# Patient Record
Sex: MALE | Race: BLACK OR AFRICAN AMERICAN | NOT HISPANIC OR LATINO | Employment: UNEMPLOYED | URBAN - METROPOLITAN AREA
[De-identification: names, ages, dates, MRNs, and addresses within clinical notes are randomized per-mention and may not be internally consistent; named-entity substitution may affect disease eponyms.]

---

## 2017-03-01 ENCOUNTER — HISTORICAL (OUTPATIENT)
Dept: RADIOLOGY | Facility: HOSPITAL | Age: 57
End: 2017-03-01

## 2017-12-07 ENCOUNTER — HISTORICAL (OUTPATIENT)
Dept: RADIOLOGY | Facility: HOSPITAL | Age: 57
End: 2017-12-07

## 2018-10-01 ENCOUNTER — HISTORICAL (OUTPATIENT)
Dept: ENDOSCOPY | Facility: HOSPITAL | Age: 58
End: 2018-10-01

## 2018-10-10 ENCOUNTER — HISTORICAL (OUTPATIENT)
Dept: RADIOLOGY | Facility: HOSPITAL | Age: 58
End: 2018-10-10

## 2019-01-31 ENCOUNTER — HISTORICAL (OUTPATIENT)
Dept: LAB | Facility: HOSPITAL | Age: 59
End: 2019-01-31

## 2022-04-09 ENCOUNTER — HISTORICAL (OUTPATIENT)
Dept: ADMINISTRATIVE | Facility: HOSPITAL | Age: 62
End: 2022-04-09

## 2022-04-25 VITALS — OXYGEN SATURATION: 99 % | SYSTOLIC BLOOD PRESSURE: 111 MMHG | DIASTOLIC BLOOD PRESSURE: 68 MMHG

## 2022-04-30 NOTE — OP NOTE
Patient:   Jose Santana Jr            MRN: [id number]            FIN: [id number]               Age:   58 years     Sex:  Male     :  1960   Associated Diagnoses:   None   Author:   Talita Velasquez MD      Procedure     PROCEDURE:  Colonoscopy    INDICATION:   screening    CONSENT:   The benefits, risks, and alternatives to the procedure were discussed and informed consent was obtained from the patient.     PREPARATION:   EKG, pulse, pulse oximetry, and blood pressure were monitored throughout the procedure.   ASA class III  Mallampati II    MEDICATIONS:   Monitored anesthesia care per anesthesiology    RECTAL EXAM:   Normal rectal exam    PROCEDURE: The colonoscope was passed through the anus under direct visualization and was advanced with ease to the cecum with visualization of the cecal folds, appendiceal orifice, and IC valve.  The scope was withdrawn and the mucosa was carefully examined. Retroflexion was performed in the rectum.  The patient tolerated the procedure well.  The preparation was good.     FINDINGS:       One 2 mm sessile polyp in the transverse colon removed with cold forceps polypectomy.    Otherwise normal colonoscopy without any mass, diverticulosis, or colitis    UNPLANNED EVENTS:   There were no unplanned events.     EBL:  < 5 cc    RECOMMENDATIONS:   - Discharge home  - Follow-up pathology  - Repeat colonoscopy in 5 years for surveillance    PATHOLOGY:   Transverse colon polyp

## 2022-04-30 NOTE — OP NOTE
Patient:   Jose Santana Jr            MRN: [id number]            FIN: [id number]               Age:   58 years     Sex:  Male     :  1960   Associated Diagnoses:   None   Author:   Talita Velasquez MD      Procedure     PROCEDURE:  Esophagogastroduodenoscopy.    INDICATION:   nausea and vomiting    CONSENT:   The benefits, risks, and alternatives to the procedure were discussed and informed consent was obtained from the patient.     PREPARATION:   EKG, pulse, pulse oximetry, and blood pressure were monitored throughout the procedure.   ASA class II  Mallampati II    MEDICATIONS:   Monitored anesthesia care per anesthesiology    PROCEDURE: The gastroscope was passed through the mouth under direct visualization and was advanced with ease to the second portion of the duodenum.  The scope was withdrawn and the mucosa was carefully examined. Retroflexion was performed in the stomach.  Patient tolerated the procedure well.     FINDINGS:   ESOPHAGUS:   Normal esophagus.  No abnormalities seen.  GE junction was at 39 cm.    STOMACH:   Diffuse friable, erythematous and atrophic mucosa involving the entire stomach with significant contact bleeding.  Small inflammatory appearing polyp in the prepyloric region. No ulcer or other lesion seen.  Biopsies taken of the antrum and body for histology and H pylori.    DUODENUM:   Normal bulb and second portion of the duodenum.      UNPLANNED EVENTS:   There were no unplanned events.     EBL:   100 cc    RECOMMENDATIONS:   - follow-up pathology  - Start pantoprazole 40mg once daily  - Stop cocaine and tobacco use  - Small frequent meals.     PATHOLOGY:   Gastric biopsies for histology and H. pylori

## 2022-05-02 NOTE — HISTORICAL [facility]
This is a historical note converted from [place]. Formatting and pictures may have been removed.  Please reference [place] for original formatting and attached multimedia. History of Present Illness  Patient is a 58-year-old male?who presents?for?a screening colonoscopy and EGD.? Patient?states that he has?an approximately two-year history of weight loss?where he has lost 30 pounds.? He also states that?he has some difficulty?swallowing, especially water.? He states that this causes him to vomit?approximately?every other day.? He denies pain with swallowing, however,?he states that it feels like the food and water?sit?and that he gets nauseated.? He was previously taking Zofran for this problem, however he does not take this medication anymore.? He denies any medications at this point.?He has bowel movements approximately every other day.? he denies any medical problems.? He does not take any medications.? He denies any family history?of?colon cancer?or?esophageal or gastric cancer.? He does have a family history of hypertension?and breast cancer.? He denies any allergies to any medications.? He smokes approximately one half pack?of cigarettes a day.? He does occasionally?use cocaine.??He denies any?abdominal surgeries.? He has had oral surgery in the past.  Review of Systems  Constitutional:?no weight gain,?weight loss,?no fatigue,?no fever,?no chills,?no weakness,?no trouble sleeping.  Neck:??no lumps,?no swollen glands,?no stiffness.  Ears:?no decreased hearing,?no ringing,?no earache,?no drainage.?  Nose:?no stuffiness,?no discharge,?no itching,?no hay fever,?no nosebleeds,?no sinus pain.  Throat:?no bleeding,?no dentures,?no sore tongue,?no dry mouth,?no sore throat,?no hoarseness,?no thrush,?no non-healing sores.  Cardiovascular:?no chest pain or discomfort,?no tightness,?no palpitations,?no SOB with activity,?no difficulty breathing while supine,?no swelling,?no sudden awakening from sleep with  SOB.  Vascular:?no calf pain with walking,?no leg cramping.  Respiratory:??no cough,?no sputum,?no coughing up blood,?no SOB,?no wheezing,?no painful breathing.  Gastrointestinal:?swallowing difficulty,?heartburn,?change in appetite,?nausea,?no change in bowel habits,?no rectal bleeding,  Urinary:?no frequency,?no urgency,?no burning or pain,?no blood in urine,?no incontinence,?no change in urinary strength.  Musculoskeletal:?no muscle or joint pain,?no stiffness,?no back pain,?no redness of joints,?no swelling of joints,?no trauma.  Skin:?no rashes,?no lumps,?no itching,?no dryness,?color normal for ethnicity,?no hair or nail changes.  Neurologic:?no dizziness,?no fainting,?no seizures,?no weakness,?no numbness,?no tingling,?no tremors.  Psychiatric:?no nervousness,?no stress,?no depression,?no memory loss.  Endocrine:?no heat or cold intolerance,?no sweating,?no frequent urination,?no thirst,?no change in appetite.  Hematologic:?no ease of bruising,?no ease of bleeding.  ?  ?  Physical Exam  General:?No acute distress.? Resting comfortably in bed.? Thin -American male  Pulm:?Clear to auscultation bilaterally  CV:?Regular rate and rhythm, no murmurs, gallops, or rubs.  ABD:?Soft, nondistended, nontender.? No appreciable scars.? Small umbilical hernia, reducible  Extremities: Good pulses throughout.? Moving all 4 extremities.? No obvious abnormalities.  Neuro:?Good strength?and sensation?in all 4 extremities.? Cranial nerves II through XII intact.  Assessment/Plan  58-year-old male?with weight loss, difficulty swallowing?that has?an troubling him for the last 2 years  -To GI?suite for EGD and screening colonoscopy.  -appropriate consents were obtained?and patient verbalized understanding?risks benefits and alternatives?of the procedures.   Problem List/Past Medical History  Ongoing  Chronic constipation  GERD (gastroesophageal reflux disease)  Nausea and/or vomiting  Tobacco abuse  Historical  No qualifying  data  Procedure/Surgical History  Incision and drainage of abscess (eg, carbuncle, suppurative hidradenitis, cutaneous or subcutaneous abscess, cyst, furuncle, or paronychia); simple or single. (03/31/2013)  Other incision with drainage of skin and subcutaneous tissue (03/31/2013)  oral surgery   Medications  Inpatient  buffered lidocaine 2% - 0.5 ml syringe, 10 mg= 0.5 mL, Subcutaneous, As Directed  IVF Lactated Ringers LR Infusion 1,000 mL, 1000 mL, IV  Home  Zofran 4 mg oral tablet, 4 mg= 1 tab(s), Oral, q8hr, PRN  Allergies  No Known Allergies  Social History  Alcohol - Low Risk, 06/13/2015  Current, 1-2 times per week, 09/01/2015  Substance Abuse - Denies Substance Abuse, 09/01/2015  Current, Cocaine, Started age 20 Years., 09/12/2015  Tobacco - High Risk, 06/13/2015  Current every day smoker, Cigarettes, 09/01/2015  Cigarettes, 06/13/2015      Reports nausea and vomiting a couple times a week after eating.? doesnt seem to matter what he eats or how much he is eating.? Gets nausea then proceeds to vomit.? Has lost 30 lbs in the past 2 years.? BM are every day to every other day.? No constipation or diarrhea.? No rectal bleeding or melena.? Smokes tobacco and uses cocaine.? No alcohol.? He denies any dysphagia.

## 2022-11-10 ENCOUNTER — HOSPITAL ENCOUNTER (EMERGENCY)
Facility: HOSPITAL | Age: 62
Discharge: HOME | End: 2022-11-10
Attending: INTERNAL MEDICINE
Payer: MEDICAID

## 2022-11-10 VITALS
HEIGHT: 72 IN | TEMPERATURE: 98 F | SYSTOLIC BLOOD PRESSURE: 166 MMHG | DIASTOLIC BLOOD PRESSURE: 96 MMHG | WEIGHT: 101.94 LBS | RESPIRATION RATE: 21 BRPM | BODY MASS INDEX: 13.81 KG/M2 | OXYGEN SATURATION: 100 % | HEART RATE: 70 BPM

## 2022-11-10 DIAGNOSIS — R07.9 NONSPECIFIC CHEST PAIN: Primary | ICD-10-CM

## 2022-11-10 DIAGNOSIS — R03.0 ELEVATED BLOOD PRESSURE READING: ICD-10-CM

## 2022-11-10 DIAGNOSIS — F14.11 HISTORY OF COCAINE ABUSE: ICD-10-CM

## 2022-11-10 LAB
ALBUMIN SERPL-MCNC: 3.4 GM/DL (ref 3.4–4.8)
ALBUMIN/GLOB SERPL: 0.9 RATIO (ref 1.1–2)
ALP SERPL-CCNC: 45 UNIT/L (ref 40–150)
ALT SERPL-CCNC: 39 UNIT/L (ref 0–55)
AST SERPL-CCNC: 41 UNIT/L (ref 5–34)
BASOPHILS # BLD AUTO: 0.03 X10(3)/MCL (ref 0–0.2)
BASOPHILS NFR BLD AUTO: 0.7 %
BILIRUBIN DIRECT+TOT PNL SERPL-MCNC: 0.5 MG/DL
BNP BLD-MCNC: 109.2 PG/ML
BUN SERPL-MCNC: 17.2 MG/DL (ref 8.4–25.7)
CALCIUM SERPL-MCNC: 9 MG/DL (ref 8.8–10)
CHLORIDE SERPL-SCNC: 107 MMOL/L (ref 98–107)
CK MB SERPL-MCNC: 7.6 NG/ML
CK SERPL-CCNC: 575 U/L (ref 30–200)
CO2 SERPL-SCNC: 25 MMOL/L (ref 23–31)
CREAT SERPL-MCNC: 0.79 MG/DL (ref 0.73–1.18)
EOSINOPHIL # BLD AUTO: 0.08 X10(3)/MCL (ref 0–0.9)
EOSINOPHIL NFR BLD AUTO: 1.8 %
ERYTHROCYTE [DISTWIDTH] IN BLOOD BY AUTOMATED COUNT: 13 % (ref 11.5–17)
GFR SERPLBLD CREATININE-BSD FMLA CKD-EPI: >60 MLS/MIN/1.73/M2
GLOBULIN SER-MCNC: 3.9 GM/DL (ref 2.4–3.5)
GLUCOSE SERPL-MCNC: 110 MG/DL (ref 82–115)
HCT VFR BLD AUTO: 40.9 % (ref 42–52)
HGB BLD-MCNC: 13.2 GM/DL (ref 14–18)
IMM GRANULOCYTES # BLD AUTO: 0.01 X10(3)/MCL (ref 0–0.04)
IMM GRANULOCYTES NFR BLD AUTO: 0.2 %
LYMPHOCYTES # BLD AUTO: 1.24 X10(3)/MCL (ref 0.6–4.6)
LYMPHOCYTES NFR BLD AUTO: 28.5 %
MAGNESIUM SERPL-MCNC: 1.8 MG/DL (ref 1.6–2.6)
MCH RBC QN AUTO: 29.5 PG (ref 27–31)
MCHC RBC AUTO-ENTMCNC: 32.3 MG/DL (ref 33–36)
MCV RBC AUTO: 91.3 FL (ref 80–94)
MONOCYTES # BLD AUTO: 0.3 X10(3)/MCL (ref 0.1–1.3)
MONOCYTES NFR BLD AUTO: 6.9 %
NEUTROPHILS # BLD AUTO: 2.7 X10(3)/MCL (ref 2.1–9.2)
NEUTROPHILS NFR BLD AUTO: 61.9 %
NRBC BLD AUTO-RTO: 0 %
PLATELET # BLD AUTO: 161 X10(3)/MCL (ref 130–400)
PMV BLD AUTO: 11.2 FL (ref 7.4–10.4)
POTASSIUM SERPL-SCNC: 3.3 MMOL/L (ref 3.5–5.1)
PROT SERPL-MCNC: 7.3 GM/DL (ref 5.8–7.6)
RBC # BLD AUTO: 4.48 X10(6)/MCL (ref 4.7–6.1)
SODIUM SERPL-SCNC: 139 MMOL/L (ref 136–145)
TROPONIN I SERPL-MCNC: 0.02 NG/ML (ref 0–0.04)
TROPONIN I SERPL-MCNC: 0.03 NG/ML (ref 0–0.04)
WBC # SPEC AUTO: 4.4 X10(3)/MCL (ref 4.5–11.5)

## 2022-11-10 PROCEDURE — 83880 ASSAY OF NATRIURETIC PEPTIDE: CPT | Performed by: FAMILY MEDICINE

## 2022-11-10 PROCEDURE — 85025 COMPLETE CBC W/AUTO DIFF WBC: CPT | Performed by: FAMILY MEDICINE

## 2022-11-10 PROCEDURE — 63600175 PHARM REV CODE 636 W HCPCS: Performed by: NURSE PRACTITIONER

## 2022-11-10 PROCEDURE — 93005 ELECTROCARDIOGRAM TRACING: CPT

## 2022-11-10 PROCEDURE — 82553 CREATINE MB FRACTION: CPT | Performed by: NURSE PRACTITIONER

## 2022-11-10 PROCEDURE — 84484 ASSAY OF TROPONIN QUANT: CPT | Performed by: FAMILY MEDICINE

## 2022-11-10 PROCEDURE — 82550 ASSAY OF CK (CPK): CPT | Performed by: NURSE PRACTITIONER

## 2022-11-10 PROCEDURE — 80053 COMPREHEN METABOLIC PANEL: CPT | Performed by: FAMILY MEDICINE

## 2022-11-10 PROCEDURE — 25000003 PHARM REV CODE 250: Performed by: NURSE PRACTITIONER

## 2022-11-10 PROCEDURE — 99285 EMERGENCY DEPT VISIT HI MDM: CPT | Mod: 25

## 2022-11-10 PROCEDURE — 84484 ASSAY OF TROPONIN QUANT: CPT | Performed by: NURSE PRACTITIONER

## 2022-11-10 PROCEDURE — 96372 THER/PROPH/DIAG INJ SC/IM: CPT | Performed by: NURSE PRACTITIONER

## 2022-11-10 PROCEDURE — 83735 ASSAY OF MAGNESIUM: CPT | Performed by: FAMILY MEDICINE

## 2022-11-10 RX ORDER — ONDANSETRON 4 MG/1
8 TABLET, ORALLY DISINTEGRATING ORAL EVERY 8 HOURS PRN
Qty: 9 TABLET | Refills: 0 | Status: SHIPPED | OUTPATIENT
Start: 2022-11-10 | End: 2022-11-13

## 2022-11-10 RX ORDER — CLONIDINE HYDROCHLORIDE 0.1 MG/1
0.1 TABLET ORAL
Status: COMPLETED | OUTPATIENT
Start: 2022-11-10 | End: 2022-11-10

## 2022-11-10 RX ORDER — LORAZEPAM 1 MG/1
1 TABLET ORAL
Status: COMPLETED | OUTPATIENT
Start: 2022-11-10 | End: 2022-11-10

## 2022-11-10 RX ORDER — LISINOPRIL 10 MG/1
10 TABLET ORAL DAILY
Qty: 90 TABLET | Refills: 0 | Status: SHIPPED | OUTPATIENT
Start: 2022-11-10 | End: 2023-02-08

## 2022-11-10 RX ORDER — ONDANSETRON 2 MG/ML
4 INJECTION INTRAMUSCULAR; INTRAVENOUS
Status: COMPLETED | OUTPATIENT
Start: 2022-11-10 | End: 2022-11-10

## 2022-11-10 RX ORDER — ONDANSETRON 2 MG/ML
4 INJECTION INTRAMUSCULAR; INTRAVENOUS ONCE
Status: DISCONTINUED | OUTPATIENT
Start: 2022-11-10 | End: 2022-11-10

## 2022-11-10 RX ADMIN — LORAZEPAM 1 MG: 1 TABLET ORAL at 07:11

## 2022-11-10 RX ADMIN — CLONIDINE HYDROCHLORIDE 0.1 MG: 0.1 TABLET ORAL at 07:11

## 2022-11-10 RX ADMIN — ONDANSETRON 4 MG: 2 INJECTION INTRAMUSCULAR; INTRAVENOUS at 08:11

## 2022-11-10 NOTE — ED PROVIDER NOTES
"Encounter Date: 11/10/2022       History     Chief Complaint   Patient presents with    Chest Pain     Pt reports waking up just prior to arrival experiencing 10-15 seconds of extreme chest tightness to where he felt he couldn't breathe, this resolved immediately but pt reports he has never felt anything like this and was concerned. Pt reports he cycles everyday and considers himself healthy. He reports he quit smoking a month ago. Pt interested in referral to medicine clinic as he does not have a pcp, bp elevated 175/91 but otherwise vss.  Addendum..the patient reports he smokes crack daily.     Pt is a 62 y.,o. male who presents to the [facility] ED complaining of chest pain which began this AM. Describes pain as an "ache" to his centralized chest. Denies cough, weakness, dizziness, abdominal pain, nausea, vomiting, or loss of bowel or bladder control. HX of cocaine use per pt. Admits pain is intermittent. Denies previous episodes in the past.    Review of patient's allergies indicates:  No Known Allergies  Past Medical History:   Diagnosis Date    Hypertension      Past Surgical History:   Procedure Laterality Date    cyst removal from sinus cavity       No family history on file.  Social History     Tobacco Use    Smoking status: Former     Types: Cigarettes    Smokeless tobacco: Never   Substance Use Topics    Alcohol use: Not Currently     Comment: Stopped years ago    Drug use: Yes     Types: Cocaine     Comment: Reports daily use but reports he is trying to stop     Review of Systems   Constitutional:  Negative for chills, diaphoresis, fatigue and fever.   HENT:  Negative for facial swelling, postnasal drip, rhinorrhea, sinus pressure, sinus pain, sore throat and trouble swallowing.    Respiratory:  Negative for cough, chest tightness, shortness of breath and wheezing.    Cardiovascular:  Positive for chest pain. Negative for palpitations and leg swelling.   Gastrointestinal:  Negative for abdominal pain, " diarrhea, nausea and vomiting.   Genitourinary:  Negative for dysuria, flank pain, hematuria and urgency.   Musculoskeletal:  Negative for arthralgias, back pain and myalgias.   Skin:  Negative for color change and rash.   Neurological:  Negative for dizziness, syncope, weakness and headaches.   Hematological:  Does not bruise/bleed easily.   All other systems reviewed and are negative.    Physical Exam     Initial Vitals [11/10/22 0537]   BP Pulse Resp Temp SpO2   (!) 175/91 (!) 50 20 97.8 °F (36.6 °C) 100 %      MAP       --         Physical Exam    Nursing note and vitals reviewed.  Constitutional: Vital signs are normal. He appears well-developed and well-nourished.   HENT:   Head: Normocephalic.   Nose: Nose normal.   Mouth/Throat: Oropharynx is clear and moist.   Eyes: Conjunctivae and EOM are normal. Pupils are equal, round, and reactive to light.   Neck: Neck supple.   Normal range of motion.  Cardiovascular:  Normal rate, regular rhythm, normal heart sounds and intact distal pulses.           Pulmonary/Chest: Effort normal and breath sounds normal. No respiratory distress. He has no wheezes. He has no rhonchi. He has no rales. He exhibits tenderness. He exhibits no bony tenderness, no crepitus, no edema, no swelling and no retraction.     Abdominal: Abdomen is soft and flat. Bowel sounds are normal. There is no abdominal tenderness. There is no rebound, no guarding, no tenderness at McBurney's point and negative Dubois's sign.   Musculoskeletal:         General: Normal range of motion.      Cervical back: Normal range of motion and neck supple.     Neurological: He is alert and oriented to person, place, and time. He has normal strength.   Skin: Skin is warm and dry. Capillary refill takes less than 2 seconds.   Psychiatric: He has a normal mood and affect. His behavior is normal. Judgment and thought content normal.       ED Course   Procedures  Labs Reviewed   COMPREHENSIVE METABOLIC PANEL - Abnormal;  Notable for the following components:       Result Value    Potassium Level 3.3 (*)     Globulin 3.9 (*)     Albumin/Globulin Ratio 0.9 (*)     Aspartate Aminotransferase 41 (*)     All other components within normal limits   B-TYPE NATRIURETIC PEPTIDE - Abnormal; Notable for the following components:    Natriuretic Peptide 109.2 (*)     All other components within normal limits   CBC WITH DIFFERENTIAL - Abnormal; Notable for the following components:    WBC 4.4 (*)     RBC 4.48 (*)     Hgb 13.2 (*)     Hct 40.9 (*)     MCHC 32.3 (*)     MPV 11.2 (*)     All other components within normal limits   CK - Abnormal; Notable for the following components:    Creatine Kinase 575 (*)     All other components within normal limits   CK-MB - Abnormal; Notable for the following components:    Creatine Kinase MB 7.6 (*)     All other components within normal limits   MAGNESIUM - Normal   TROPONIN I - Normal   TROPONIN I - Normal   CBC W/ AUTO DIFFERENTIAL    Narrative:     The following orders were created for panel order CBC Auto Differential.  Procedure                               Abnormality         Status                     ---------                               -----------         ------                     CBC with Differential[[id number]]        Abnormal            Final result                 Please view results for these tests on the individual orders.   EXTRA TUBES    Narrative:     The following orders were created for panel order EXTRA TUBES.  Procedure                               Abnormality         Status                     ---------                               -----------         ------                     Light Blue Top Hold[[id number]]                                                         Gold Top Hold[[id number]]                                                                 Please view results for these tests on the individual orders.   LIGHT BLUE TOP HOLD   GOLD TOP HOLD        ECG Results               EKG 12-lead (In process)  Result time 11/10/22 08:19:31      In process by Interface, Lab In [facility] (11/10/22 08:19:31)                   Narrative:    Test Reason : R07.9,    Vent. Rate : 050 BPM     Atrial Rate : 050 BPM     P-R Int : 114 ms          QRS Dur : 080 ms      QT Int : 432 ms       P-R-T Axes : 073 073 -07 degrees     QTc Int : 393 ms    Sinus bradycardia  ST elevation, consider early repolarization, pericarditis, or injury  T wave abnormality, consider inferior ischemia  Abnormal ECG  When compared with ECG of 10-NOV-2022 05:34,  ST now depressed in Inferior leads  ST elevation now present in Lateral leads  T wave inversion now evident in Inferior leads    Referred By: UNKNOWN REFERRING           Confirmed By:                                      EKG 12-lead (Preliminary result)  Result time 11/10/22 05:45:12      ED Interpretation by Fabian Umana MD (11/10/22 05:45:12, [facility], Emergency Medicine)    Sinus bradycardia rate of 46, no signs of ST elevation or depression, normal axis, normal intervals with a QTC of 404                                  Imaging Results              X-Ray Chest PA And Lateral (Final result)  Result time 11/10/22 07:50:28      Final result by Quoc Ashton MD (11/10/22 07:50:28)                   Impression:      No acute cardiopulmonary process.      Electronically signed by: Quoc Ashton  Date:    11/10/2022  Time:    07:50               Narrative:    EXAMINATION:  XR CHEST PA AND LATERAL    CLINICAL HISTORY:  Chest pain;    TECHNIQUE:  Two views of the chest    COMPARISON:  No prior imaging available for comparison.    FINDINGS:  No focal opacification, pleural effusion, or pneumothorax.    The cardiomediastinal silhouette is within normal limits.    No acute osseous abnormality.                                       Medications   cloNIDine tablet 0.1 mg (0.1 mg Oral Given 11/10/22 0702)   LORazepam tablet 1 mg (1 mg Oral  Given 11/10/22 0739)   cloNIDine tablet 0.1 mg (0.1 mg Oral Given 11/10/22 0739)   ondansetron injection 4 mg (4 mg Intramuscular Given 11/10/22 0832)     Medical Decision Making:   Differential Diagnosis:   AMI  Nonspecific chest pain  Clinical Tests:   Lab Tests: Ordered and Reviewed  Radiological Study: Ordered and Reviewed  Medical Tests: Ordered and Reviewed  ED Management:  8:36 AM Reassessed patient at this time. Reports pain has mildly improved but still is having some discomfort. I discussed with pt in detail his diagnostic results and stressed to him the dangers of continuing to use cocaine and how it can cause his current symptoms. Pt's troponin is displaying no acute elevations. After discussion with Dr. Lewis, ED physician, plan for pt is diuscharge home on symptomatic medication and will be referred to both IM and Cardiology Services for follow up. Blood pressure has improved with medication. Discussed with patient all pertinent ED information and results. Discussed diagnosis and treatment plan with patient. Follow up instructions and return to ED instruction have been given. All questions and concerns were addressed at this time. Patient voices understanding of information and instructions. Patient is comfortable with plan and discharge. Patient is stable for discharge.     7:37 AM Pt initial lab values do not indicate AMI. I will repeat diagnostic testing to trend troponin for elevations. Pt reports his initial chest pain has again flared. Discussed pt condition with Dr. Lewis, ED physician. Physician recommends additional clonidine dose as well as ativan for pain symptoms. New orders placed.           ED Course as of 11/10/22 0846   Thu Nov 10, 2022   0555 CPK MB(!): 7.6 [JA]   0734 CPK(!): 575 [JA]      ED Course User Index  [JA] Rolly Dyer Jr., FNP                 Clinical Impression:   Final diagnoses:  [R07.9] Nonspecific chest pain (Primary)  [R03.0] Elevated blood pressure  reading  [F14.11] History of cocaine abuse        ED Disposition Condition    Discharge Stable          ED Prescriptions       Medication Sig Dispense Start Date End Date Auth. Provider    ondansetron (ZOFRAN-ODT) 4 MG TbDL Take 2 tablets (8 mg total) by mouth every 8 (eight) hours as needed (Nausea). 9 tablet 11/10/2022 11/13/2022 Rolly Dyer Jr., TAWNYA    lisinopriL 10 MG tablet Take 1 tablet (10 mg total) by mouth once daily. 90 tablet 11/10/2022 2/8/2023 TAWNYA Navarro Jr.          Follow-up Information       Follow up With Specialties Details Why Contact Info    [facility] Emergency Medicine In 3 days As needed, If symptoms worsen [address] [place] [id number] [phone]    [facility]  Schedule an appointment as soon as possible for a visit in 1 week Follow up with [facility] for evaluation. [address] [place] [phone]    [facility]  Schedule an appointment as soon as possible for a visit in 1 week Follow up with [facility] to obtain a PCP [address] [place] [phone]             TAWNYA Navarro Jr.  11/10/22 0851

## 2022-11-10 NOTE — DISCHARGE INSTRUCTIONS
Follow up with IM Clinic as discussed to obtain a PCP.  Follow up with Cardiology Clinic for further evaluation.  Stop cocaine use.  Return to the [facility] ED immediately for worsening chest pain, SOB, or fever.